# Patient Record
Sex: MALE | Race: WHITE | HISPANIC OR LATINO | Employment: STUDENT | URBAN - NONMETROPOLITAN AREA
[De-identification: names, ages, dates, MRNs, and addresses within clinical notes are randomized per-mention and may not be internally consistent; named-entity substitution may affect disease eponyms.]

---
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
## 2024-01-09 ENCOUNTER — OFFICE VISIT (OUTPATIENT)
Dept: OTOLARYNGOLOGY | Facility: CLINIC | Age: 19
End: 2024-01-09
Payer: COMMERCIAL

## 2024-01-09 VITALS — HEIGHT: 69 IN | BODY MASS INDEX: 22.22 KG/M2 | WEIGHT: 150 LBS | TEMPERATURE: 96.8 F

## 2024-01-09 DIAGNOSIS — J34.2 DEVIATED NASAL SEPTUM: Primary | ICD-10-CM

## 2024-01-09 DIAGNOSIS — S02.2XXA CLOSED FRACTURE OF NASAL BONE, INITIAL ENCOUNTER: ICD-10-CM

## 2024-01-09 PROCEDURE — 1036F TOBACCO NON-USER: CPT | Performed by: OTOLARYNGOLOGY

## 2024-01-09 PROCEDURE — 99203 OFFICE O/P NEW LOW 30 MIN: CPT | Performed by: OTOLARYNGOLOGY

## 2024-01-09 ASSESSMENT — PATIENT HEALTH QUESTIONNAIRE - PHQ9
1. LITTLE INTEREST OR PLEASURE IN DOING THINGS: NOT AT ALL
2. FEELING DOWN, DEPRESSED OR HOPELESS: NOT AT ALL
SUM OF ALL RESPONSES TO PHQ9 QUESTIONS 1 & 2: 0

## 2024-01-09 NOTE — PROGRESS NOTES
"Chief Complaint   Patient presents with    New Patient Visit     N/P  hairline nasal fracture about three week     HPI:  Miguel Glasgow is a 18 y.o. male who in December 17 while doing some home construction hit his nose into a beam.  Went to the emergency department.  Was noted to have fractures of the nose as well as nasal septum and laceration on the bridge of the nose which was closed.  Currently doing well.  Has no complaints of any cosmetic deformity nor does he have difficulty with nasal congestion or difficulty breathing through his nose.  No nasal drainage, visual changes, trismus, or malocclusion.    12/17/23/CT:  IMPRESSION:  1. Slightly depressed fracture of the tip of the nasal bone with  associated soft tissue air adjacent to the fracture site. There is  also comminuted fracture of the anterior aspect of the nasal septum  with angulation of the apex of the fracture site to the left with  associated mucosal thickening of the adjacent soft tissues.  2. Right frontal scalp hematoma is incidentally noted.    PMH:  History reviewed. No pertinent past medical history.  History reviewed. No pertinent surgical history.      Medications:   No current outpatient medications on file.     Allergies:  No Known Allergies     ROS:  Review of systems normal unless stated otherwise in the HPI and/or PMH.    Physical Exam:  Temperature 36 °C (96.8 °F), height 1.753 m (5' 9\"), weight 68 kg (150 lb). Body mass index is 22.15 kg/m².     GENERAL APPEARANCE: Well developed and well nourished.  Alert and oriented in no acute distress.  Normal vocal quality.      HEAD/FACE: No erythema or edema or facial tenderness.  Normal facial nerve function bilaterally.    EAR:       EXTERNAL: Normal pinnas and external auditory canals without lesion or obstructing wax.       MIDDLE EAR: Tympanic membranes intact and mobile with normal landmarks.  Middle ear space appears well aerated.       TUBE STATUS: N/A       MASTOID CAVITY: N/A       " HEARING: Gross hearing assessment is within normal limits.      NOSE:       VISUALIZED USING: Anterior rhinoscopy with headlight and nasal speculum.       DORSUM: Midline, nontraumatic appearance.  Laceration closed and healed.       MUCOSA: Normal-appearing.       SECRETIONS: Normal.       SEPTUM: Left deviation       INFERIOR TURBINATES: Normal.       MIDDLE TURBINATES/MEATUS: N/A       BLEEDING: N/A         ORAL CAVITY/PHARYNX:       TEETH: Adequate dentition.       TONGUE: No mass or lesion.  Normal mobility.       FLOOR OF MOUTH: No mass or lesion.       PALATE: Normal hard palate, soft palate, and uvula.       OROPHARYNX: Normal without mass or lesion.       BUCCAL MUCOSA/GBS: Normal without mass or lesion.       LIPS: Normal.    LARYNX/HYPOPHARYNX/NASOPHARYNX: N/A    NECK: No palpable masses or abnormal adenopathy.  Trachea is midline.    THYROID: No thyromegaly or palpable nodule.    SALIVARY GLANDS: Normal bilateral parotid and submandibular glands by inspection and palpation.    TMJ's: Normal.    NEURO: Cranial nerve exam grossly normal bilaterally.       Assessment/Plan   Miguel was seen today for new patient visit.  Diagnoses and all orders for this visit:  Deviated nasal septum (Primary)  Closed fracture of nasal bone, initial encounter     Currently doing well.  No problems with form or function and has no complaints.  No further treatment necessary.  Follow up if symptoms worsen or fail to improve.     Rao Rey MD

## 2024-10-16 ENCOUNTER — OFFICE VISIT (OUTPATIENT)
Age: 19
End: 2024-10-16

## 2024-10-16 VITALS
DIASTOLIC BLOOD PRESSURE: 74 MMHG | HEART RATE: 94 BPM | SYSTOLIC BLOOD PRESSURE: 118 MMHG | HEIGHT: 69 IN | TEMPERATURE: 97.5 F | BODY MASS INDEX: 22.22 KG/M2 | RESPIRATION RATE: 16 BRPM | WEIGHT: 150 LBS | OXYGEN SATURATION: 98 %

## 2024-10-16 DIAGNOSIS — R05.8 PRODUCTIVE COUGH: Primary | ICD-10-CM

## 2024-10-16 PROCEDURE — 99213 OFFICE O/P EST LOW 20 MIN: CPT | Performed by: NURSE PRACTITIONER

## 2024-10-16 RX ORDER — AZITHROMYCIN 250 MG/1
TABLET, FILM COATED ORAL
Qty: 6 TABLET | Refills: 0 | Status: SHIPPED | OUTPATIENT
Start: 2024-10-16 | End: 2024-10-26

## 2024-10-16 ASSESSMENT — ENCOUNTER SYMPTOMS
NAUSEA: 0
ABDOMINAL PAIN: 0
COUGH: 1
SHORTNESS OF BREATH: 0
SORE THROAT: 0
CHEST TIGHTNESS: 0
WHEEZING: 0
VOMITING: 0
DIARRHEA: 0

## 2024-10-16 ASSESSMENT — PATIENT HEALTH QUESTIONNAIRE - PHQ9
2. FEELING DOWN, DEPRESSED OR HOPELESS: NOT AT ALL
SUM OF ALL RESPONSES TO PHQ QUESTIONS 1-9: 0
SUM OF ALL RESPONSES TO PHQ QUESTIONS 1-9: 0
1. LITTLE INTEREST OR PLEASURE IN DOING THINGS: NOT AT ALL
SUM OF ALL RESPONSES TO PHQ QUESTIONS 1-9: 0
SUM OF ALL RESPONSES TO PHQ QUESTIONS 1-9: 0
SUM OF ALL RESPONSES TO PHQ9 QUESTIONS 1 & 2: 0

## 2024-10-16 NOTE — PROGRESS NOTES
Subjective:      Review of Systems   Constitutional:  Positive for chills. Negative for fever.   HENT:  Positive for congestion. Negative for sore throat.    Respiratory:  Positive for cough. Negative for chest tightness, shortness of breath and wheezing.    Cardiovascular:  Negative for chest pain and palpitations.   Gastrointestinal:  Negative for abdominal pain, diarrhea, nausea and vomiting.   Musculoskeletal:  Positive for myalgias.   Neurological:  Positive for headaches. Negative for dizziness and light-headedness.         Objective:     Vitals:    10/16/24 0906   BP: 118/74   Site: Left Upper Arm   Position: Sitting   Pulse: 94   Resp: 16   Temp: 97.5 °F (36.4 °C)   SpO2: 98%   Weight: 68 kg (150 lb)   Height: 1.753 m (5' 9\")       Body mass index is 22.15 kg/m².    Wt Readings from Last 3 Encounters:   10/16/24 68 kg (150 lb)   12/11/23 69.4 kg (153 lb) (47%, Z= -0.07)*   11/02/23 69.9 kg (154 lb) (49%, Z= -0.01)*     * Growth percentiles are based on CDC (Boys, 2-20 Years) data.     BP Readings from Last 3 Encounters:   10/16/24 118/74   12/11/23 118/70   11/02/23 120/76       Physical Exam  Vitals and nursing note reviewed.   Constitutional:       General: He is not in acute distress.     Appearance: Normal appearance. He is normal weight. He is not ill-appearing or toxic-appearing.   HENT:      Head: Normocephalic.      Right Ear: Hearing, tympanic membrane, ear canal and external ear normal.      Left Ear: Hearing, tympanic membrane, ear canal and external ear normal.      Nose: Congestion present.      Right Sinus: No maxillary sinus tenderness or frontal sinus tenderness.      Left Sinus: No maxillary sinus tenderness or frontal sinus tenderness.      Mouth/Throat:      Lips: Pink.      Mouth: Mucous membranes are moist.      Pharynx: Uvula midline. Posterior oropharyngeal erythema present.      Tonsils: No tonsillar exudate. 0 on the right. 0 on the left.   Cardiovascular:      Rate and

## 2024-12-30 ENCOUNTER — APPOINTMENT (OUTPATIENT)
Dept: PRIMARY CARE | Facility: CLINIC | Age: 19
End: 2024-12-30
Payer: COMMERCIAL